# Patient Record
Sex: FEMALE | URBAN - METROPOLITAN AREA
[De-identification: names, ages, dates, MRNs, and addresses within clinical notes are randomized per-mention and may not be internally consistent; named-entity substitution may affect disease eponyms.]

---

## 2020-09-30 ENCOUNTER — APPOINTMENT (OUTPATIENT)
Age: 55
Setting detail: DERMATOLOGY
End: 2020-10-01

## 2020-09-30 VITALS — WEIGHT: 152 LBS | TEMPERATURE: 98.6 F | RESPIRATION RATE: 18 BRPM | HEIGHT: 55 IN

## 2020-09-30 DIAGNOSIS — L81.0 POSTINFLAMMATORY HYPERPIGMENTATION: ICD-10-CM

## 2020-09-30 PROCEDURE — OTHER COUNSELING: OTHER

## 2020-09-30 PROCEDURE — OTHER TREATMENT REGIMEN: OTHER

## 2020-09-30 PROCEDURE — 99202 OFFICE O/P NEW SF 15 MIN: CPT

## 2020-09-30 ASSESSMENT — LOCATION SIMPLE DESCRIPTION DERM
LOCATION SIMPLE: RIGHT THIGH
LOCATION SIMPLE: UPPER BACK
LOCATION SIMPLE: LEFT ANTERIOR NECK

## 2020-09-30 ASSESSMENT — LOCATION DETAILED DESCRIPTION DERM
LOCATION DETAILED: SUPERIOR THORACIC SPINE
LOCATION DETAILED: RIGHT ANTERIOR PROXIMAL THIGH
LOCATION DETAILED: LEFT INFERIOR ANTERIOR NECK

## 2020-09-30 ASSESSMENT — LOCATION ZONE DERM
LOCATION ZONE: TRUNK
LOCATION ZONE: NECK
LOCATION ZONE: LEG

## 2020-09-30 NOTE — PROCEDURE: TREATMENT REGIMEN
Detail Level: Zone
Samples Given: scant traumatic excoriations may be treated with Pramosone lotion (samples given), apply to affected areas twice a day x 14 days, stop x 7 days, repeat as needed
Plan: Return to clinic as needed.\\n\\nPatient has PIH from areas that have been scratched in the past. Advised sun protection. Discussed appropriate moisturizing regimen. \\n\\nAdvised discontinuing scratching of areas that she is currently traumatizing. She states she cannot have topical steroid due to history of eye concerns. I advised her to discuss topical steroids with her eye doctor because topicals should not affect her other medical conditions if used appropriately.\\n\\nPatient has history of sensitive skin and allergic rhinitis treating with singulair and benadryl. Advised switching singulair for another daily non-sedating antihistamine. Continue using hypoallergenic detergents.

## 2020-10-08 ENCOUNTER — RX ONLY (RX ONLY)
Age: 55
End: 2020-10-08

## 2020-10-08 RX ORDER — HYDROCORTISONE 25 MG/ML
LOTION TOPICAL
Qty: 1 | Refills: 3 | Status: ERX | COMMUNITY
Start: 2020-10-08